# Patient Record
(demographics unavailable — no encounter records)

---

## 2025-01-07 NOTE — ASSESSMENT
[FreeTextEntry1] : Very pleasant 46-year-old woman who presents for follow-up of kidney stones, hypocitraturia -Renal ultrasound images reviewed with the patient today demonstrating a 6 mm right lower pole stone, stable from prior imaging -Continue potassium citrate-refill sent to the pharmacy -Follow-up in 6 months with repeat renal ultrasound or sooner necessary  Patient is being seen today for evaluation and management of a chronic and longitudinal ongoing condition and I am the primary treating physician

## 2025-01-07 NOTE — HISTORY OF PRESENT ILLNESS
[FreeTextEntry1] : Very pleasant 46-year-old woman who presents for follow-up of kidney stones, hypocitraturia.  She underwent a repeat renal ultrasound today which demonstrated a 6 mm right lower pole stone, stable from prior imaging.  She denies flank pain.  She continues to take potassium citrate.  No other complaints.

## 2025-06-24 NOTE — HISTORY OF PRESENT ILLNESS
[Currently Experiencing ___] :  [unfilled] [Urinary Urgency] : urinary urgency [Urinary Frequency] : urinary frequency [Dysuria] : dysuria [None] : None [FreeTextEntry1] : Ms. Mora is a very pleasant 46 year old woman here today for UTI. She reports feeling the same since she was here last. She reports that she has since finished fosfomycin. She reports feeling well when taking the antibiotic but reports since finishing she has had burning again. She finished the antibiotic yesterday. Denies any hematuria.

## 2025-06-24 NOTE — PHYSICAL EXAM
[Normal Appearance] : normal appearance [Well Groomed] : well groomed [General Appearance - In No Acute Distress] : no acute distress [Edema] : no peripheral edema [Respiration, Rhythm And Depth] : normal respiratory rhythm and effort [Exaggerated Use Of Accessory Muscles For Inspiration] : no accessory muscle use [Abdomen Soft] : soft [Costovertebral Angle Tenderness] : no ~M costovertebral angle tenderness [Abdomen Tenderness] : non-tender [Normal Station and Gait] : the gait and station were normal for the patient's age [] : no rash [No Focal Deficits] : no focal deficits [Oriented To Time, Place, And Person] : oriented to person, place, and time [Affect] : the affect was normal [Mood] : the mood was normal [No Palpable Adenopathy] : no palpable adenopathy

## 2025-06-24 NOTE — ASSESSMENT
[FreeTextEntry1] : Ms. Mora is a very pleasant 46 year old woman here today for UTI. She reports feeling the same since she was here last. She reports that she has since finished fosfomycin. She reports feeling well when taking the antibiotic but reports since finishing she has had burning again. She finished the antibiotic yesterday. Denies any hematuria. UC. UA. Ureaplasma/mycoplasma. Fosfomycin for additional 3 days. Pyridium for pain. RTO in 1 week - repeat CT if symptoms have not improved.  Patient is being seen today for evaluation and management of a chronic and longitudinal ongoing condition and I am the primary treating physician

## 2025-07-01 NOTE — HISTORY OF PRESENT ILLNESS
[FreeTextEntry1] : Very pleasant 46-year-old woman who presents for follow-up of kidney stones, recurrent urinary tract infection.  She was admitted to the hospital recently with concerns for pyelonephritis.  A CT scan demonstrated an approximately 5 to 6 mm nonobstructing right kidney stone without hydronephrosis.  She was discharged with oral antibiotics with nitrofurantoin but reports persistent dysuria for which she was started on fosfomycin.  She completed a course of antibiotics.  She reports that 3 days ago she began to feel much better.  She reports no flank pain.  No hematuria.  No dysuria.  She stopped taking phenazopyridine and oxybutynin.

## 2025-07-01 NOTE — ASSESSMENT
[FreeTextEntry1] : Very pleasant 46-year-old woman who presents for follow-up of kidney stones, recurrent urinary tract infections, dysuria - Recent CT images reviewed demonstrating no ureteral stones but it does demonstrate an approximately 5 to 6 mm renal calcification concerning for a right renal stone.  It demonstrates no hydronephrosis nor renal masses but it does demonstrate an apparent fibroid - Stop phenazopyridine - Stop fosfomycin - Stop oxybutynin - Follow-up in 3 months with renal ultrasound - Discussed that she may call or come to the office immediately if she begins to experience symptoms of urinary tract infection again - Continue cranberry supplements - Discussed starting Hip-Moreno if she again develops another urinary tract infection  Patient is being seen today for evaluation and management of a chronic and longitudinal ongoing condition and I am the primary treating physician  Addendum: Discussed final results of CT scan demonstrating 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  I recommended that she follow-up with a hepatologist and I provided her with a reference to do so

## 2025-07-18 NOTE — HISTORY OF PRESENT ILLNESS
[FreeTextEntry1] : Very pleasant 46-year-old woman who presents for follow-up of dysuria, urinary frequency, fever.  She went to the emergency department on 7/7 as well as 7/13 with similar complaints.  A CT scan was done initially which demonstrated no ureteral stones nor hydronephrosis and a nondistended bladder.  It demonstrated a persistence kidney stone, unchanged in position.  She subsequently returned to the emergency department and a renal ultrasound was performed.  On all occasions urine cultures have been negative.  She still reports increased urinary frequency.  She reports a fever today.  She is opposed to going back to the emergency department at this time.  She reports severe dry mouth with oxybutynin and therefore stopped taking the medication.

## 2025-07-18 NOTE — ASSESSMENT
[FreeTextEntry1] : Very pleasant 46-year-old woman who presents for follow-up of dysuria, fever, increased urinary frequency - Patient reports that she took a medication in [place] which helped with her urinary symptoms and would like to try another medication at this time.  She was previously prescribed oxybutynin but reports severe dry mouth with the medication and therefore stopped taking it - Trial of mirabegron - Discussed risks and benefits of mirabegron - Start Diflucan given patient concern for a yeast infection and fungal UTI at this time - Check fungal urine culture - Bacterial urine culture negative - Ureaplasma/mycoplasma negative - Check GC/chlamydia - Check urine for acid-fast bacillus - Follow-up in 2 weeks  Patient is being seen today for evaluation and management of a chronic and longitudinal ongoing condition and I am the primary treating physician